# Patient Record
Sex: MALE | Race: WHITE | NOT HISPANIC OR LATINO | Employment: OTHER | URBAN - METROPOLITAN AREA
[De-identification: names, ages, dates, MRNs, and addresses within clinical notes are randomized per-mention and may not be internally consistent; named-entity substitution may affect disease eponyms.]

---

## 2019-08-02 ENCOUNTER — OFFICE VISIT (OUTPATIENT)
Dept: CARDIOLOGY | Facility: MEDICAL CENTER | Age: 79
End: 2019-08-02
Payer: MEDICARE

## 2019-08-02 VITALS
DIASTOLIC BLOOD PRESSURE: 68 MMHG | SYSTOLIC BLOOD PRESSURE: 122 MMHG | HEIGHT: 69 IN | HEART RATE: 94 BPM | BODY MASS INDEX: 25.52 KG/M2 | OXYGEN SATURATION: 96 % | WEIGHT: 172.29 LBS

## 2019-08-02 DIAGNOSIS — I48.0 PAROXYSMAL ATRIAL FIBRILLATION (HCC): ICD-10-CM

## 2019-08-02 DIAGNOSIS — I10 ESSENTIAL HYPERTENSION: ICD-10-CM

## 2019-08-02 LAB — EKG IMPRESSION: NORMAL

## 2019-08-02 PROCEDURE — 99205 OFFICE O/P NEW HI 60 MIN: CPT | Performed by: INTERNAL MEDICINE

## 2019-08-02 PROCEDURE — 93000 ELECTROCARDIOGRAM COMPLETE: CPT | Performed by: INTERNAL MEDICINE

## 2019-08-02 RX ORDER — GLIPIZIDE 10 MG/1
10 TABLET ORAL
Refills: 0 | COMMUNITY
Start: 2019-07-24

## 2019-08-02 RX ORDER — TAMSULOSIN HYDROCHLORIDE 0.4 MG/1
0.4 CAPSULE ORAL
Refills: 0 | COMMUNITY
Start: 2019-05-27

## 2019-08-02 RX ORDER — ASPIRIN 81 MG/1
81 TABLET, CHEWABLE ORAL DAILY
COMMUNITY

## 2019-08-02 RX ORDER — FINASTERIDE 5 MG/1
TABLET, FILM COATED ORAL
Refills: 0 | COMMUNITY
Start: 2019-07-23

## 2019-08-02 RX ORDER — ATORVASTATIN CALCIUM 10 MG/1
10 TABLET, FILM COATED ORAL
Refills: 0 | COMMUNITY
Start: 2019-06-20

## 2019-08-02 RX ORDER — AMIODARONE HYDROCHLORIDE 200 MG/1
200 TABLET ORAL
Refills: 0 | COMMUNITY
Start: 2019-07-08 | End: 2019-08-02

## 2019-08-02 RX ORDER — DIGOXIN 125 MCG
125 TABLET ORAL
Refills: 0 | COMMUNITY
Start: 2019-07-08 | End: 2019-08-02

## 2019-08-02 NOTE — PROGRESS NOTES
CARDIOLOGY NEW PATIENT CONSULTATION    PCP: Jerardo Calderon M.D.  Primary Cardiologist: Ambrosio Post M.D.    1. Paroxysmal atrial fibrillation (HCC)  CHADS2-VASc= 6. The basis for the diagnosis is a bit shaky, and it is surprising that he has not been treated with anticoagulation given that it was supposedly diagnosed in the context of a stroke.  I therefore advised that he stop amiodarone as well as digoxin.   -I did order a echocardiogram and ZIO Patch to better understand his risk of A. fib and cardiovascular disease    2. Essential hypertension  Well-controlled      Follow up with Ambrosio Post M.D. in to be determined after testing is complete     Chief Complaint   Patient presents with   • Atrial Fibrillation       History: Philipp Clifford is a 79 y.o. male with history of hypertension, diabetes, paroxysmal atrial fibrillation and CVA presenting for evaluation of atrial fibrillation.  He reports 3 years ago suffering a CVA and was hospitalized at [facility].  During that time he was diagnosed with atrial fibrillation.  He does not recall experiencing any symptoms then or since.  He was not prescribed anticoagulants.  He is taking aspirin.  He was prescribed amiodarone as well as digoxin.  He has been reading and understands that these medications are potentially toxic and has experimented with temporarily interrupting therapy without any complications.  He is also concerned about what he calls the Exrbb-Xfr-Xxy sensation; or jitteriness which he feels may be related to amiodarone.    He exercises daily and feels good doing activities like riding bikes and push-ups    ROS:   All other systems reviewed and negative except as per the HPI    Past Medical History:   Diagnosis Date   • Arthritis    • HTN (hypertension)    • Hyperglycemia      Past Surgical History:   Procedure Laterality Date   • ANTROSTOMY  7/20/2012    Performed by MAYTE KIRKLAND at [facility] ORS   • ETHMOIDECTOMY  7/20/2012     Performed by MAYTE KIRKLAND at SURGERY [facility] ORS   • INGUINAL HERNIA REPAIR  1995    right     No Known Allergies  Outpatient Encounter Medications as of 8/2/2019   Medication Sig Dispense Refill   • atorvastatin (LIPITOR) 10 MG Tab Take 10 mg by mouth.  0   • finasteride (PROSCAR) 5 MG Tab TAKE 1 TABLET BY MOUTH ONCE DAILY FOR 30 DAYS  0   • tamsulosin (FLOMAX) 0.4 MG capsule Take 0.4 mg by mouth.  0   • glipiZIDE (GLUCOTROL) 10 MG Tab Take 10 mg by mouth.  0   • aspirin (ASA) 81 MG Chew Tab chewable tablet Take 81 mg by mouth every day.     • lisinopril (PRINIVIL) 10 MG TABS Take 1 Tab by mouth every day. 30 Each 2   • [DISCONTINUED] amiodarone (CORDARONE) 200 MG Tab Take 200 mg by mouth.  0   • [DISCONTINUED] digoxin (LANOXIN) 125 MCG Tab Take 125 mcg by mouth.  0   • glipiZIDE (GLUCOTROL) 5 MG TABS Take 1 Tab by mouth 3 times a day. (Patient not taking: Reported on 8/2/2019) 270 Each 0   • hydrocodone/acetaminophen (NORCO)  MG TABS Take 1-2 Tabs by mouth every 6 hours as needed for Mild Pain. (Patient not taking: Reported on 8/2/2019) 120 Each 0   • nabumetone (RELAFEN) 500 MG TABS Take 1 Tab by mouth 2 times a day. (Patient not taking: Reported on 8/2/2019) 60 Tab 3     No facility-administered encounter medications on file as of 8/2/2019.        Family History   Problem Relation Age of Onset   • Lung Disease Other      Social History     Socioeconomic History   • Marital status: Single     Spouse name: Not on file   • Number of children: Not on file   • Years of education: Not on file   • Highest education level: Not on file   Occupational History   • Not on file   Social Needs   • Financial resource strain: Not on file   • Food insecurity:     Worry: Not on file     Inability: Not on file   • Transportation needs:     Medical: Not on file     Non-medical: Not on file   Tobacco Use   • Smoking status: Never Smoker   • Smokeless tobacco: Never Used   Substance and Sexual Activity   • Alcohol  "use: Not Currently     Alcohol/week: 0.5 oz     Types: 1 Cans of beer per week   • Drug use: No   • Sexual activity: Not on file   Lifestyle   • Physical activity:     Days per week: Not on file     Minutes per session: Not on file   • Stress: Not on file   Relationships   • Social connections:     Talks on phone: Not on file     Gets together: Not on file     Attends Synagogue service: Not on file     Active member of club or organization: Not on file     Attends meetings of clubs or organizations: Not on file     Relationship status: Not on file   • Intimate partner violence:     Fear of current or ex partner: Not on file     Emotionally abused: Not on file     Physically abused: Not on file     Forced sexual activity: Not on file   Other Topics Concern   • Not on file   Social History Narrative   • Not on file       PE:  /68 (BP Location: Left arm, Patient Position: Sitting, BP Cuff Size: Adult)   Pulse 94   Ht 1.753 m (5' 9\")   Wt 78.1 kg (172 lb 4.6 oz)   SpO2 96%   BMI 25.44 kg/m²   GEN: Well appearing  HEENT: Symmetric face. Anicteric sclerae. Moist mucus membranes  NECK: No JVD. No lymphadenopathy  CARDIAC:  Normal PMI, regular, normal S1, S2.   VASCULATURE: Normal carotid amplitude without bruit.   RESP: Clear to auscultation bilaterally  ABD: Soft, non-tender, non-distended  EXT: No  edema, no clubbing or cyanosis  SKIN: Warm and dry  NEURO: No gross deficits   PSYCH: Appropriate affect, participates in conversation    Studies  Lab Results   Component Value Date/Time    CHOLSTRLTOT 164 08/02/2012 08:45 AM    LDL 98 08/02/2012 08:45 AM    HDL 48 08/02/2012 08:45 AM    TRIGLYCERIDE 92 08/02/2012 08:45 AM       Lab Results   Component Value Date/Time    SODIUM 133 (L) 08/02/2012 08:45 AM    POTASSIUM 3.9 08/02/2012 08:45 AM    CHLORIDE 97 08/02/2012 08:45 AM    CO2 24 08/02/2012 08:45 AM    GLUCOSE 294 (H) 08/02/2012 08:45 AM    BUN 9 08/02/2012 08:45 AM    CREATININE 0.94 08/02/2012 08:45 AM "     Lab Results   Component Value Date/Time    ALKPHOSPHAT 65 08/02/2012 08:45 AM    ASTSGOT 17 08/02/2012 08:45 AM    ALTSGPT 17 08/02/2012 08:45 AM    TBILIRUBIN 1.3 08/02/2012 08:45 AM      No results found for: BNPBTYPENAT    For this encounter I directly reviewed ECG tracings he is in sinus rhythm today and was in sinus rhythm in 2012

## 2019-08-02 NOTE — PROGRESS NOTES
CARDIOLOGY NEW PATIENT CONSULTATION    PCP: Jerardo Calderon M.D.  Primary Cardiologist: Ambrosio Post M.D.    1. Paroxysmal atrial fibrillation (HCC)  ***    2. Essential hypertension  ***      Follow up with Ambrosio Post M.D. in {Number or As needed:05312} {Weeks, months, years:27560}     Chief Complaint   Patient presents with   • Atrial Fibrillation       History: Philipp Clifford is a 79 y.o. male with history of {Cardiac history:70672} presenting for evaluation of ***[]    ROS: ***  All other systems reviewed and negative except as per the HPI    Past Medical History:   Diagnosis Date   • Arthritis    • HTN (hypertension)    • Hyperglycemia      Past Surgical History:   Procedure Laterality Date   • ANTROSTOMY  7/20/2012    Performed by MAYTE KIRKLAND at SURGERY [facility] ORS   • ETHMOIDECTOMY  7/20/2012    Performed by MAYTE KIRKLAND at [facility] ORS   • INGUINAL HERNIA REPAIR  1995    right     No Known Allergies  Outpatient Encounter Medications as of 8/2/2019   Medication Sig Dispense Refill   • amiodarone (CORDARONE) 200 MG Tab Take 200 mg by mouth.  0   • atorvastatin (LIPITOR) 10 MG Tab Take 10 mg by mouth.  0   • digoxin (LANOXIN) 125 MCG Tab Take 125 mcg by mouth.  0   • finasteride (PROSCAR) 5 MG Tab TAKE 1 TABLET BY MOUTH ONCE DAILY FOR 30 DAYS  0   • tamsulosin (FLOMAX) 0.4 MG capsule Take 0.4 mg by mouth.  0   • glipiZIDE (GLUCOTROL) 10 MG Tab Take 10 mg by mouth.  0   • lisinopril (PRINIVIL) 10 MG TABS Take 1 Tab by mouth every day. 30 Each 2   • glipiZIDE (GLUCOTROL) 5 MG TABS Take 1 Tab by mouth 3 times a day. (Patient not taking: Reported on 8/2/2019) 270 Each 0   • hydrocodone/acetaminophen (NORCO)  MG TABS Take 1-2 Tabs by mouth every 6 hours as needed for Mild Pain. (Patient not taking: Reported on 8/2/2019) 120 Each 0   • nabumetone (RELAFEN) 500 MG TABS Take 1 Tab by mouth 2 times a day. (Patient not taking: Reported on 8/2/2019) 60 Tab 3     No  "facility-administered encounter medications on file as of 8/2/2019.      ***  Family History   Problem Relation Age of Onset   • Lung Disease Other      Social History     Socioeconomic History   • Marital status: Single     Spouse name: Not on file   • Number of children: Not on file   • Years of education: Not on file   • Highest education level: Not on file   Occupational History   • Not on file   Social Needs   • Financial resource strain: Not on file   • Food insecurity:     Worry: Not on file     Inability: Not on file   • Transportation needs:     Medical: Not on file     Non-medical: Not on file   Tobacco Use   • Smoking status: Never Smoker   • Smokeless tobacco: Never Used   Substance and Sexual Activity   • Alcohol use: Not Currently     Alcohol/week: 0.5 oz     Types: 1 Cans of beer per week   • Drug use: No   • Sexual activity: Not on file   Lifestyle   • Physical activity:     Days per week: Not on file     Minutes per session: Not on file   • Stress: Not on file   Relationships   • Social connections:     Talks on phone: Not on file     Gets together: Not on file     Attends Yarsani service: Not on file     Active member of club or organization: Not on file     Attends meetings of clubs or organizations: Not on file     Relationship status: Not on file   • Intimate partner violence:     Fear of current or ex partner: Not on file     Emotionally abused: Not on file     Physically abused: Not on file     Forced sexual activity: Not on file   Other Topics Concern   • Not on file   Social History Narrative   • Not on file       PE:  /68 (BP Location: Left arm, Patient Position: Sitting, BP Cuff Size: Adult)   Pulse 94   Ht 1.753 m (5' 9\")   Wt 78.1 kg (172 lb 4.6 oz)   SpO2 96%   BMI 25.44 kg/m²   GEN: Well appearing  HEENT: Symmetric face. Anicteric sclerae. Moist mucus membranes  NECK: No JVD***. No lymphadenopathy  CARDIAC: *** Normal PMI, regular, normal S1, S2.   VASCULATURE: Normal " carotid amplitude without bruit.   RESP: Clear to auscultation bilaterally  ABD: Soft, non-tender, non-distended  EXT: No *** edema, no clubbing or cyanosis  SKIN: Warm and dry  NEURO: No gross deficits ***  PSYCH: Appropriate affect, participates in conversation    Studies  Lab Results   Component Value Date/Time    CHOLSTRLTOT 164 08/02/2012 08:45 AM    LDL 98 08/02/2012 08:45 AM    HDL 48 08/02/2012 08:45 AM    TRIGLYCERIDE 92 08/02/2012 08:45 AM       Lab Results   Component Value Date/Time    SODIUM 133 (L) 08/02/2012 08:45 AM    POTASSIUM 3.9 08/02/2012 08:45 AM    CHLORIDE 97 08/02/2012 08:45 AM    CO2 24 08/02/2012 08:45 AM    GLUCOSE 294 (H) 08/02/2012 08:45 AM    BUN 9 08/02/2012 08:45 AM    CREATININE 0.94 08/02/2012 08:45 AM     Lab Results   Component Value Date/Time    ALKPHOSPHAT 65 08/02/2012 08:45 AM    ASTSGOT 17 08/02/2012 08:45 AM    ALTSGPT 17 08/02/2012 08:45 AM    TBILIRUBIN 1.3 08/02/2012 08:45 AM      No results found for: BNPBTYPENAT    For this encounter I directly reviewed {EKG, ECHO, CATH STUDIES REVIEWED:71597} ***

## 2019-08-26 ENCOUNTER — HOSPITAL ENCOUNTER (OUTPATIENT)
Dept: CARDIOLOGY | Facility: MEDICAL CENTER | Age: 79
End: 2019-08-26
Attending: INTERNAL MEDICINE
Payer: MEDICARE

## 2019-08-26 DIAGNOSIS — I10 ESSENTIAL HYPERTENSION: ICD-10-CM

## 2019-08-26 DIAGNOSIS — I48.0 PAROXYSMAL ATRIAL FIBRILLATION (HCC): ICD-10-CM

## 2019-08-26 LAB
LV EJECT FRACT  99904: 60
LV EJECT FRACT MOD 2C 99903: 57.34
LV EJECT FRACT MOD 4C 99902: 58.93
LV EJECT FRACT MOD BP 99901: 59.11

## 2019-08-26 PROCEDURE — 93306 TTE W/DOPPLER COMPLETE: CPT | Mod: 26 | Performed by: INTERNAL MEDICINE

## 2019-08-26 PROCEDURE — 93306 TTE W/DOPPLER COMPLETE: CPT

## 2019-08-27 ENCOUNTER — TELEPHONE (OUTPATIENT)
Dept: CARDIOLOGY | Facility: MEDICAL CENTER | Age: 79
End: 2019-08-27

## 2019-08-27 NOTE — TELEPHONE ENCOUNTER
Result Notes for EC-ECHOCARDIOGRAM COMPLETE W/O CONT   Notes recorded by Ambrosio Post M.D. on 8/26/2019 at 4:19 PM PDT  Echocardiogram looks favorable. Continue current treatment plan     Left message at personal VM at [phone] notifying of results and encouraging him to call with any questions. Also provided with office schedulers number to set up Zio, as it doesn't look like pt has scheduled this yet.

## 2020-08-05 ENCOUNTER — OFFICE VISIT (OUTPATIENT)
Dept: URGENT CARE | Facility: PHYSICIAN GROUP | Age: 80
End: 2020-08-05
Payer: MEDICARE

## 2020-08-05 VITALS
HEART RATE: 84 BPM | BODY MASS INDEX: 23.43 KG/M2 | TEMPERATURE: 97.8 F | WEIGHT: 173 LBS | SYSTOLIC BLOOD PRESSURE: 148 MMHG | HEIGHT: 72 IN | OXYGEN SATURATION: 99 % | DIASTOLIC BLOOD PRESSURE: 70 MMHG | RESPIRATION RATE: 16 BRPM

## 2020-08-05 DIAGNOSIS — L30.9 DERMATITIS: ICD-10-CM

## 2020-08-05 PROCEDURE — 99204 OFFICE O/P NEW MOD 45 MIN: CPT | Performed by: PHYSICIAN ASSISTANT

## 2020-08-05 RX ORDER — DIGOXIN 125 MCG
TABLET ORAL
COMMUNITY
End: 2021-02-09

## 2020-08-05 RX ORDER — ESCITALOPRAM OXALATE 10 MG/1
TABLET ORAL
COMMUNITY
End: 2021-02-09

## 2020-08-05 RX ORDER — AMIODARONE HYDROCHLORIDE 200 MG/1
TABLET ORAL
COMMUNITY

## 2020-08-05 RX ORDER — PREDNISONE 10 MG/1
TABLET ORAL
Qty: 1 QUANTITY SUFFICIENT | Refills: 0 | Status: SHIPPED | OUTPATIENT
Start: 2020-08-05 | End: 2021-02-09

## 2020-08-05 NOTE — PROGRESS NOTES
Chief Complaint   Patient presents with   • Rash     bilat ankles, legs, and lower back x1 month        HISTORY OF PRESENT ILLNESS: Patient is a 80 y.o. male who presents today for the following:    Patient comes in for evaluation of a rash that started about a month ago.  He saw dermatology for the same and was put on a cream that did not help.  He is not sure what the cream was.  He complains of severe itching but denies pain.  He has lesions on his back and legs.  He denies weeping but does have scabs on the ones on his lower legs.  Patient denies any new exposures.    Patient Active Problem List    Diagnosis Date Noted   • Non compliance w medication regimen 04/08/2014   • URI (upper respiratory infection) 11/25/2013   • DIABETES MELLITUS 09/19/2012   • HTN (hypertension)    • Hyperglycemia        Allergies:Patient has no known allergies.    Current Outpatient Medications Ordered in Epic   Medication Sig Dispense Refill   • predniSONE (DELTASONE) 10 MG Tab 50 mg x 1 day; 40 mg x 1 day; 30 mg x 1 day; 20 mg x 1 day; 10 mg x 1 day 1 Quantity Sufficient 0   • mupirocin (BACTROBAN) 2 % Ointment Apply 1 Application to affected area(s) 2 times a day for 10 days. 1 g 0   • atorvastatin (LIPITOR) 10 MG Tab Take 10 mg by mouth.  0   • finasteride (PROSCAR) 5 MG Tab TAKE 1 TABLET BY MOUTH ONCE DAILY FOR 30 DAYS  0   • tamsulosin (FLOMAX) 0.4 MG capsule Take 0.4 mg by mouth.  0   • glipiZIDE (GLUCOTROL) 10 MG Tab Take 10 mg by mouth.  0   • lisinopril (PRINIVIL) 10 MG TABS Take 1 Tab by mouth every day. 30 Each 2   • amiodarone (CORDARONE) 200 MG Tab amiodarone 200 mg tablet     • escitalopram (LEXAPRO) 10 MG Tab escitalopram 10 mg tablet     • digoxin (LANOXIN) 125 MCG Tab digoxin 125 mcg (0.125 mg) tablet     • aspirin (ASA) 81 MG Chew Tab chewable tablet Take 81 mg by mouth every day.     • glipiZIDE (GLUCOTROL) 5 MG TABS Take 1 Tab by mouth 3 times a day. (Patient not taking: Reported on 8/5/2020) 270 Each 0   •  hydrocodone/acetaminophen (NORCO)  MG TABS Take 1-2 Tabs by mouth every 6 hours as needed for Mild Pain. (Patient not taking: Reported on 2019) 120 Each 0   • nabumetone (RELAFEN) 500 MG TABS Take 1 Tab by mouth 2 times a day. (Patient not taking: Reported on 2019) 60 Tab 3     No current Epic-ordered facility-administered medications on file.        Past Medical History:   Diagnosis Date   • Arthritis    • HTN (hypertension)    • Hyperglycemia        Social History     Tobacco Use   • Smoking status: Never Smoker   • Smokeless tobacco: Never Used   Substance Use Topics   • Alcohol use: Not Currently     Alcohol/week: 0.5 oz     Types: 1 Cans of beer per week   • Drug use: No       Family Status   Relation Name Status   • Mo     • Fa     • Sis  Alive   • Sis  Alive   • OTHER  (Not Specified)     Family History   Problem Relation Age of Onset   • Lung Disease Other        Review of Systems:    Constitutional ROS: No unexpected change in weight, No weakness, No fatigue  Pulmonary ROS: No chronic cough, sputum, or hemoptysis, No dyspnea on exertion, No wheezing  Cardiovascular ROS: No diaphoresis, No edema, No palpitations  Hematologic/Lymphatic ROS: No chills, No night sweats, No weight loss  Skin/Integumentary ROS: Positive for rash.      Exam:  /70   Pulse 84   Temp 36.6 °C (97.8 °F)   Resp 16   Ht 1.829 m (6')   Wt 78.5 kg (173 lb)   SpO2 99%   General: Well developed, well nourished. No distress.    HENT: Head is grossly normal.  Pulmonary: Unlabored respiratory effort.   Neurologic: Grossly nonfocal. No facial asymmetry noted.  Skin: Scattered 1 to 2 mm raised erythematous lesions on the back that devon with pressure and are nontender to palpation.  Two lesions on the right upper leg, erythematous, irregular borders, with scaling, approximately 1-2 cm in diameter.  Right ankle shows a 2 cm annular lesion with a yellowish crust and right ankle shows a similar lesion only  smaller.  Psych: Normal mood. Alert and oriented to person, place and time.    Assessment/Plan:  Patient appears to have 3 different types of lesions.  Feel applying cream on his back would be difficult and his diabetes is well controlled with an A1c always less than 7.  We will try 1 round of oral steroids and have him apply mupirocin to the lesions on the lower legs, given the yellowish crust.  Follow-up with dermatology if symptoms do not improve.  1. Dermatitis  predniSONE (DELTASONE) 10 MG Tab    mupirocin (BACTROBAN) 2 % Ointment

## 2021-02-09 ENCOUNTER — OFFICE VISIT (OUTPATIENT)
Dept: URGENT CARE | Facility: PHYSICIAN GROUP | Age: 81
End: 2021-02-09
Payer: MEDICARE

## 2021-02-09 VITALS
WEIGHT: 183.4 LBS | HEIGHT: 70 IN | OXYGEN SATURATION: 96 % | BODY MASS INDEX: 26.26 KG/M2 | TEMPERATURE: 98.3 F | SYSTOLIC BLOOD PRESSURE: 176 MMHG | DIASTOLIC BLOOD PRESSURE: 70 MMHG | RESPIRATION RATE: 16 BRPM | HEART RATE: 104 BPM

## 2021-02-09 DIAGNOSIS — Z86.39 HISTORY OF DIABETES MELLITUS: ICD-10-CM

## 2021-02-09 DIAGNOSIS — L40.50 PSORIATIC ARTHRITIS (HCC): ICD-10-CM

## 2021-02-09 LAB
GLUCOSE BLD-MCNC: 192 MG/DL (ref 70–100)
HBA1C MFR BLD: 6.8 % (ref 0–5.6)
INT CON NEG: ABNORMAL
INT CON POS: ABNORMAL

## 2021-02-09 PROCEDURE — 83036 HEMOGLOBIN GLYCOSYLATED A1C: CPT | Performed by: PHYSICIAN ASSISTANT

## 2021-02-09 PROCEDURE — 82962 GLUCOSE BLOOD TEST: CPT | Performed by: PHYSICIAN ASSISTANT

## 2021-02-09 PROCEDURE — 99214 OFFICE O/P EST MOD 30 MIN: CPT | Performed by: PHYSICIAN ASSISTANT

## 2021-02-09 RX ORDER — PREDNISONE 10 MG/1
TABLET ORAL
Qty: 1 QUANTITY SUFFICIENT | Refills: 0 | Status: SHIPPED | OUTPATIENT
Start: 2021-02-09

## 2021-02-09 NOTE — PROGRESS NOTES
Chief Complaint   Patient presents with   • Arthritis     psoriacic arthritis-flare up        HISTORY OF PRESENT ILLNESS: Patient is a 80 y.o. male who presents today for the following:    Patient comes in for evaluation of itching skin and worsening joint pain.  He has a history of psoriatic arthritis.  He states over the last several days he has had worsening skin irritation, bilateral knee pain, bilateral hand pain.  He is not currently on any chronic medication for the arthritis.  He has a history of diabetes.  A1c today is 6.8.  Patient has a primary care provider but has been unable to get a hold of him.    Patient Active Problem List    Diagnosis Date Noted   • Non compliance w medication regimen 04/08/2014   • URI (upper respiratory infection) 11/25/2013   • DIABETES MELLITUS 09/19/2012   • HTN (hypertension)    • Hyperglycemia        Allergies:Patient has no known allergies.    Current Outpatient Medications Ordered in Epic   Medication Sig Dispense Refill   • predniSONE (DELTASONE) 10 MG Tab 50 mg x 1 day; 40 mg x 1 day; 30 mg x 1 day; 20 mg x 1 day; 10 mg x 1 day 1 Quantity Sufficient 0   • amiodarone (CORDARONE) 200 MG Tab amiodarone 200 mg tablet     • atorvastatin (LIPITOR) 10 MG Tab Take 10 mg by mouth.  0   • finasteride (PROSCAR) 5 MG Tab TAKE 1 TABLET BY MOUTH ONCE DAILY FOR 30 DAYS  0   • tamsulosin (FLOMAX) 0.4 MG capsule Take 0.4 mg by mouth.  0   • glipiZIDE (GLUCOTROL) 10 MG Tab Take 10 mg by mouth.  0   • aspirin (ASA) 81 MG Chew Tab chewable tablet Take 81 mg by mouth every day.     • lisinopril (PRINIVIL) 10 MG TABS Take 1 Tab by mouth every day. 30 Each 2   • glipiZIDE (GLUCOTROL) 5 MG TABS Take 1 Tab by mouth 3 times a day. 270 Each 0     No current [facility]-ordered facility-administered medications on file.        Past Medical History:   Diagnosis Date   • Arthritis    • HTN (hypertension)    • Hyperglycemia        Social History     Tobacco Use   • Smoking status: Never Smoker   • Smokeless  "tobacco: Never Used   Substance Use Topics   • Alcohol use: Not Currently     Alcohol/week: 0.5 oz     Types: 1 Cans of beer per week   • Drug use: No       Family Status   Relation Name Status   • Mo     • Fa     • Sis  Alive   • Sis  Alive   • OTHER  (Not Specified)     Family History   Problem Relation Age of Onset   • Lung Disease Other        Review of Systems:    Constitutional ROS: No unexpected change in weight, No weakness, No fatigue  Pulmonary ROS: No chronic cough, sputum, or hemoptysis, No dyspnea on exertion, No wheezing  Cardiovascular ROS: No diaphoresis, No edema, No palpitations  Musculoskeletal/Extremities ROS: Joint pain  Hematologic/Lymphatic ROS: No chills, No night sweats, No weight loss  Skin/Integumentary ROS: No edema, No evidence of rash, No itching      Exam:  BP (!) 176/70   Pulse (!) 104   Temp 36.8 °C (98.3 °F)   Resp 16   Ht 1.778 m (5' 10\")   Wt 83.2 kg (183 lb 6.4 oz)   SpO2 96%   General: Well developed, well nourished. No distress.    HENT: Head is grossly normal.  Pulmonary: Unlabored respiratory effort.   Neurologic: Grossly nonfocal. No facial asymmetry noted.  Musculoskeletal: Diffuse tenderness in the hands and knees.  Trace edema is noted in the knees bilaterally.  Skin: Warm, dry, good turgor. No rashes in visible areas, no significant psoriatic plaques noted.  Psych: Normal mood. Alert and oriented to person, place and time.    Glucose: 192  Point-of-care A1c: 6.8%    Assessment/Plan:  Known psoriatic arthritis with current flare. A1C 6.8% - has had relief with prednisone in the past.  Given patient's level of pain, will dose again today.  Advised following up with primary care for further evaluation management.  Patient states he has not been able to get touch with his primary care provider.  We will have him set up an appointment in this clinic to follow-up.  Discussed appropriate over-the-counter symptomatic medication, and when to return to " clinic. Follow up for worsening or persistent symptoms.  1. History of diabetes mellitus  POCT Hemoglobin A1C    POCT Glucose   2. Dermatitis  predniSONE (DELTASONE) 10 MG Tab

## 2021-06-17 ENCOUNTER — APPOINTMENT (OUTPATIENT)
Dept: URGENT CARE | Facility: PHYSICIAN GROUP | Age: 81
End: 2021-06-17

## 2023-10-15 ENCOUNTER — HOSPITAL ENCOUNTER (EMERGENCY)
Facility: MEDICAL CENTER | Age: 83
End: 2023-10-15
Attending: EMERGENCY MEDICINE
Payer: MEDICARE

## 2023-10-15 VITALS
HEIGHT: 72 IN | DIASTOLIC BLOOD PRESSURE: 91 MMHG | BODY MASS INDEX: 25.06 KG/M2 | OXYGEN SATURATION: 96 % | HEART RATE: 109 BPM | RESPIRATION RATE: 16 BRPM | WEIGHT: 185 LBS | SYSTOLIC BLOOD PRESSURE: 186 MMHG | TEMPERATURE: 98.2 F

## 2023-10-15 DIAGNOSIS — H57.12 LEFT EYE PAIN: ICD-10-CM

## 2023-10-15 LAB
ALBUMIN SERPL BCP-MCNC: 4.3 G/DL (ref 3.2–4.9)
ALBUMIN/GLOB SERPL: 1.8 G/DL
ALP SERPL-CCNC: 94 U/L (ref 30–99)
ALT SERPL-CCNC: 14 U/L (ref 2–50)
ANION GAP SERPL CALC-SCNC: 12 MMOL/L (ref 7–16)
AST SERPL-CCNC: 13 U/L (ref 12–45)
BASOPHILS # BLD AUTO: 0.3 % (ref 0–1.8)
BASOPHILS # BLD: 0.03 K/UL (ref 0–0.12)
BILIRUB SERPL-MCNC: 0.8 MG/DL (ref 0.1–1.5)
BUN SERPL-MCNC: 17 MG/DL (ref 8–22)
CALCIUM ALBUM COR SERPL-MCNC: 8.4 MG/DL (ref 8.5–10.5)
CALCIUM SERPL-MCNC: 8.6 MG/DL (ref 8.5–10.5)
CHLORIDE SERPL-SCNC: 98 MMOL/L (ref 96–112)
CO2 SERPL-SCNC: 23 MMOL/L (ref 20–33)
CREAT SERPL-MCNC: 0.71 MG/DL (ref 0.5–1.4)
EOSINOPHIL # BLD AUTO: 0.06 K/UL (ref 0–0.51)
EOSINOPHIL NFR BLD: 0.6 % (ref 0–6.9)
ERYTHROCYTE [DISTWIDTH] IN BLOOD BY AUTOMATED COUNT: 41.1 FL (ref 35.9–50)
GFR SERPLBLD CREATININE-BSD FMLA CKD-EPI: 90 ML/MIN/1.73 M 2
GLOBULIN SER CALC-MCNC: 2.4 G/DL (ref 1.9–3.5)
GLUCOSE SERPL-MCNC: 222 MG/DL (ref 65–99)
HCT VFR BLD AUTO: 42.2 % (ref 42–52)
HGB BLD-MCNC: 14.3 G/DL (ref 14–18)
IMM GRANULOCYTES # BLD AUTO: 0.02 K/UL (ref 0–0.11)
IMM GRANULOCYTES NFR BLD AUTO: 0.2 % (ref 0–0.9)
LYMPHOCYTES # BLD AUTO: 2.62 K/UL (ref 1–4.8)
LYMPHOCYTES NFR BLD: 26.3 % (ref 22–41)
MCH RBC QN AUTO: 31.2 PG (ref 27–33)
MCHC RBC AUTO-ENTMCNC: 33.9 G/DL (ref 32.3–36.5)
MCV RBC AUTO: 91.9 FL (ref 81.4–97.8)
MONOCYTES # BLD AUTO: 0.82 K/UL (ref 0–0.85)
MONOCYTES NFR BLD AUTO: 8.2 % (ref 0–13.4)
NEUTROPHILS # BLD AUTO: 6.43 K/UL (ref 1.82–7.42)
NEUTROPHILS NFR BLD: 64.4 % (ref 44–72)
NRBC # BLD AUTO: 0 K/UL
NRBC BLD-RTO: 0 /100 WBC (ref 0–0.2)
PLATELET # BLD AUTO: 207 K/UL (ref 164–446)
PMV BLD AUTO: 10.8 FL (ref 9–12.9)
POTASSIUM SERPL-SCNC: 3.7 MMOL/L (ref 3.6–5.5)
PROT SERPL-MCNC: 6.7 G/DL (ref 6–8.2)
RBC # BLD AUTO: 4.59 M/UL (ref 4.7–6.1)
SODIUM SERPL-SCNC: 133 MMOL/L (ref 135–145)
WBC # BLD AUTO: 10 K/UL (ref 4.8–10.8)

## 2023-10-15 PROCEDURE — 85025 COMPLETE CBC W/AUTO DIFF WBC: CPT

## 2023-10-15 PROCEDURE — 36415 COLL VENOUS BLD VENIPUNCTURE: CPT

## 2023-10-15 PROCEDURE — 80053 COMPREHEN METABOLIC PANEL: CPT

## 2023-10-15 PROCEDURE — 99284 EMERGENCY DEPT VISIT MOD MDM: CPT

## 2023-10-15 RX ORDER — PREDNISOLONE ACETATE 10 MG/ML
1 SUSPENSION/ DROPS OPHTHALMIC 4 TIMES DAILY
Qty: 5 ML | Refills: 0 | Status: SHIPPED | OUTPATIENT
Start: 2023-10-15

## 2023-10-15 RX ORDER — LOSARTAN POTASSIUM 100 MG/1
100 TABLET ORAL DAILY
COMMUNITY

## 2023-10-15 RX ORDER — PROPARACAINE HYDROCHLORIDE 5 MG/ML
1 SOLUTION/ DROPS OPHTHALMIC ONCE
Status: DISCONTINUED | OUTPATIENT
Start: 2023-10-15 | End: 2023-10-15 | Stop reason: HOSPADM

## 2023-10-15 RX ORDER — PREDNISOLONE ACETATE 10 MG/ML
1 SUSPENSION/ DROPS OPHTHALMIC
Status: DISCONTINUED | OUTPATIENT
Start: 2023-10-15 | End: 2023-10-15 | Stop reason: HOSPADM

## 2023-10-15 RX ORDER — SODIUM CHLORIDE 9 MG/ML
500 INJECTION, SOLUTION INTRAVENOUS ONCE
Status: DISCONTINUED | OUTPATIENT
Start: 2023-10-15 | End: 2023-10-15 | Stop reason: HOSPADM

## 2023-10-15 ASSESSMENT — FIBROSIS 4 INDEX: FIB4 SCORE: 1.46

## 2023-10-15 NOTE — ED PROVIDER NOTES
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        EXTERNAL RECORDS REVIEWED  External ED Note Seen at OSH for left eye pain and blurred vision in the setting of recent intraocular injection of Avastin. His ophthamologist was contacted and recommended transfer to [facility] for evaluation.    HPI/ROS  LIMITATION TO HISTORY   Select: : None  OUTSIDE HISTORIAN(S):  None    Philipp Clifford is a 83 y.o. male who presents with a chief complaint of left eye pain.  Patient was seen by his ophthalmologist on Friday, 2 days ago, for an intraocular injection of a Avastin.  This was his second injection of the same.  He had immediate pain and blurred vision after the injection which has persisted.  The symptoms are not similar to the first injection after which he was completely asymptomatic.  He had drainage from the eye as well as pain.  He does not have any vision from the right eye at baseline so ultimately was taken to [facility] by EMS for evaluation.  The provider at the outside facility was able to speak with the patient's ophthalmologist who requested transfer to the [facility] ER to facilitate ophthalmologic evaluation as the patient is unable to drive because he cannot see.    PAST MEDICAL HISTORY   has a past medical history of Arthritis, HTN (hypertension), and Hyperglycemia.    SURGICAL HISTORY   has a past surgical history that includes inguinal hernia repair (1995); antrostomy (7/20/2012); and ethmoidectomy (7/20/2012).    FAMILY HISTORY  Family History   Problem Relation Age of Onset    Lung Disease Other        SOCIAL HISTORY  Social History     Tobacco Use    Smoking status: Never    Smokeless tobacco: Never   Substance and Sexual Activity    Alcohol use: Not Currently     Alcohol/week: 0.5 oz      Types: 1 Cans of beer per week    Drug use: No    Sexual activity: Not on file       CURRENT MEDICATIONS  Home Medications       Reviewed by Danica Cardoza R.N. (Registered Nurse) on 10/15/23 at 1514  Med List Status: Partial     Medication Last Dose Status   amiodarone (CORDARONE) 200 MG Tab 10/15/2023 Active   aspirin (ASA) 81 MG Chew Tab chewable tablet 10/15/2023 Active   atorvastatin (LIPITOR) 10 MG Tab  Active   finasteride (PROSCAR) 5 MG Tab  Active   glipiZIDE (GLUCOTROL) 10 MG Tab  Active   glipiZIDE (GLUCOTROL) 5 MG TABS 10/15/2023 Active   lisinopril (PRINIVIL) 10 MG TABS not taking Active   losartan (COZAAR) 100 MG Tab 10/15/2023 Active   predniSONE (DELTASONE) 10 MG Tab  Active   tamsulosin (FLOMAX) 0.4 MG capsule  Active                    ALLERGIES  No Known Allergies    PHYSICAL EXAM  VITAL SIGNS: BP (!) 186/91   Pulse (!) 109   Resp 16   Ht 1.829 m (6')   Wt 83.9 kg (185 lb)   SpO2 96%   BMI 25.09 kg/m²    Physical Exam  Vitals and nursing note reviewed.   Constitutional:       Appearance: Normal appearance.   HENT:      Head: Normocephalic and atraumatic.      Right Ear: External ear normal.      Left Ear: External ear normal.      Nose: Nose normal.      Mouth/Throat:      Mouth: Mucous membranes are dry.   Eyes:      Extraocular Movements: Extraocular movements intact.      Comments: Injected left conjunctiva   Cardiovascular:      Rate and Rhythm: Regular rhythm. Tachycardia present.      Pulses: Normal pulses.   Pulmonary:      Effort: Pulmonary effort is normal.      Breath sounds: Normal breath sounds.   Abdominal:      Palpations: Abdomen is soft.      Tenderness: There is no abdominal tenderness.   Musculoskeletal:         General: Normal range of motion.      Cervical back: Normal range of motion and neck supple.   Skin:     General: Skin is warm and dry.   Neurological:      General: No focal deficit present.      Mental Status: He is alert and oriented to person, place, and  time.   Psychiatric:         Mood and Affect: Mood normal.         Behavior: Behavior normal.       DIAGNOSTIC STUDIES / PROCEDURES  LABS  Results for orders placed or performed during the hospital encounter of 10/15/23   CBC With Differential   Result Value Ref Range    WBC 10.0 4.8 - 10.8 K/uL    RBC 4.59 (L) 4.70 - 6.10 M/uL    Hemoglobin 14.3 14.0 - 18.0 g/dL    Hematocrit 42.2 42.0 - 52.0 %    MCV 91.9 81.4 - 97.8 fL    MCH 31.2 27.0 - 33.0 pg    MCHC 33.9 32.3 - 36.5 g/dL    RDW 41.1 35.9 - 50.0 fL    Platelet Count 207 164 - 446 K/uL    MPV 10.8 9.0 - 12.9 fL    Neutrophils-Polys 64.40 44.00 - 72.00 %    Lymphocytes 26.30 22.00 - 41.00 %    Monocytes 8.20 0.00 - 13.40 %    Eosinophils 0.60 0.00 - 6.90 %    Basophils 0.30 0.00 - 1.80 %    Immature Granulocytes 0.20 0.00 - 0.90 %    Nucleated RBC 0.00 0.00 - 0.20 /100 WBC    Neutrophils (Absolute) 6.43 1.82 - 7.42 K/uL    Lymphs (Absolute) 2.62 1.00 - 4.80 K/uL    Monos (Absolute) 0.82 0.00 - 0.85 K/uL    Eos (Absolute) 0.06 0.00 - 0.51 K/uL    Baso (Absolute) 0.03 0.00 - 0.12 K/uL    Immature Granulocytes (abs) 0.02 0.00 - 0.11 K/uL    NRBC (Absolute) 0.00 K/uL   Comp Metabolic Panel   Result Value Ref Range    Sodium 133 (L) 135 - 145 mmol/L    Potassium 3.7 3.6 - 5.5 mmol/L    Chloride 98 96 - 112 mmol/L    Co2 23 20 - 33 mmol/L    Anion Gap 12.0 7.0 - 16.0    Glucose 222 (H) 65 - 99 mg/dL    Bun 17 8 - 22 mg/dL    Creatinine 0.71 0.50 - 1.40 mg/dL    Calcium 8.6 8.5 - 10.5 mg/dL    Correct Calcium 8.4 (L) 8.5 - 10.5 mg/dL    AST(SGOT) 13 12 - 45 U/L    ALT(SGPT) 14 2 - 50 U/L    Alkaline Phosphatase 94 30 - 99 U/L    Total Bilirubin 0.8 0.1 - 1.5 mg/dL    Albumin 4.3 3.2 - 4.9 g/dL    Total Protein 6.7 6.0 - 8.2 g/dL    Globulin 2.4 1.9 - 3.5 g/dL    A-G Ratio 1.8 g/dL   ESTIMATED GFR   Result Value Ref Range    GFR (CKD-EPI) 90 >60 mL/min/1.73 m 2     RADIOLOGY  Radiologist interpretation:   No orders to display     COURSE & MEDICAL DECISION MAKING    ED  Observation Status? Yes; I am placing the patient in to an observation status due to a diagnostic uncertainty as well as therapeutic intensity. Patient placed in observation status at 3:25 PM, 10/15/2023.     Observation plan is as follows: Labs, medication, consultation    Upon Reevaluation, the patient's condition has: Not improved but patient has declined any further workup or admission and is leaving AGAINST MEDICAL ADVICE.    Patient discharged from ED Observation status at 4:28 PM (Time) 10/15/2023 (Date).     INITIAL ASSESSMENT, COURSE AND PLAN  Care Narrative: This is an 83 year old male who was sent here from [facility] ED in order to undergo ophthalmologic evaluation given that his ophthalmologist is not on call for, or does not have privileges at, [facility] [place]. The concern given his recent intraocular injection is for endophthalmitis. He arrives tachycardic and hypertensive but AF with otherwise normal VS. He appears slightly dehydrated but non-toxic. Left pupil is sluggishly reactive. I spoke with Dr. Washington, the patient's ophthalmologist, who is coming in for a bedside evaluation. She does not recommend any ophthalmic workup prior to her arrival. However, given the patient's tachycardia I attempted a medical workup. Basic labs were collected. Dr. Washington evaluated the patient and is comfortable with discharge from an ophthalmic perspective. Plan for pred forte four times daily and follow up in clinic on Tuesday. Patient then decided he would no longer stay for a medical workup despite his persistent tachycardia.     The patient is leaving against medical advice.  I discussed with the patient the risks of leaving without receiving appropriate care to include permanent disability or death.  I have discussed possible alternatives.  The patient is not intoxicated.  The patient has capacity and understands the risks and benefits of their decision.  While I certainly disagree with the patient's decision, I  respect the patient's autonomy and will not keep them here against their will.  They understand that their decision to leave can be reversed at any time and they can return to us at any time for any reason at all.  I have asked the nurses to have the patient sign an AMA form and to witness this signature.    Patient discharged in fair condition with very strict return precautions.    ADDITIONAL PROBLEM LIST  None  DISPOSITION AND DISCUSSIONS  I have discussed management of the patient with the following physicians and XUAN's:  Dr. Hernandes, ophthalmology.    Discussion of management with other Q or appropriate source(s): None     Escalation of care considered, and ultimately not performed:after discussion with the patient / family, they have elected to decline an escalation in care    Barriers to care at this time, including but not limited to:  None .     Decision tools and prescription drugs considered including, but not limited to:  Pred forte ophthalmic drops .    FINAL DIAGNOSIS  1. Left eye pain      Electronically signed by: Tristen Bonner M.D., 10/15/2023 3:20 PM

## 2023-10-15 NOTE — ED TRIAGE NOTES
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    Pt has ha2 2 shots total to his L eye 1 month apart. The first shot was fine, the second one he had a reaction to.  Chart up for ERP.

## 2023-10-15 NOTE — CONSULTS
Ophthalmology Consults         CC: left eye blurry vision 2 days after intravitreal injection     HIP: Pt received Avantin injection in his left eye on 10/13/2023. Reports blurry vision , tearing and redness today.         Eye exam:  VA near card LP OD      20/80     OS  EOM Full OU  VF to CF Full OU        External exam.   L/L WNL OU  C/S White and quite OD and Sub conj heme OS  Cornea: Clear OU  Iris: flat and round OS  A/C: formed OU     DFE OS at 4:05 pm    No signs of vit cells  No retinal heme      A/P:  Blurry vision, left eye   - no signs of AC or vit cells   -likely some corneal irritation due betadine  - No signs of endophthalmitis  -will start pt on PF 1% QID   -follow up at [place] office on Tuesday

## 2023-10-15 NOTE — ED NOTES
RN assist:  pt refused Blood cultures, ERP informed and to bedside.  Pt wanting to leave AMA.  Risks discussed with pt by ERP, AMA form signed and in chart.  Pt calling for cab home.  Discharge instructions given.  All questions answered.  Prescriptions given x1.  Pt to follow-up with Ophthalmologist, return to ER if symptoms worsen as discussed.  Pt verbalized understanding.  All belongings with pt.  Pt escorted to lobby.

## 2023-10-15 NOTE — DISCHARGE INSTRUCTIONS
You were seen in the ER for eye pain.  The ophthalmologist evaluated you and recommend eyedrops 4 times a day.  I have sent in a prescription on your behalf.  I did want to look into your fast heart rate here but you have declined and are leaving AGAINST MEDICAL ADVICE.  This does not mean that you are in trouble.  In fact, if you develop any new or worsening symptoms you should return immediately to the ER.  Please follow-up with both your ophthalmologist as well as your primary care physician this week for recheck.  Return with any new or worsening symptoms.